# Patient Record
Sex: FEMALE | Race: BLACK OR AFRICAN AMERICAN | NOT HISPANIC OR LATINO | Employment: FULL TIME | URBAN - METROPOLITAN AREA
[De-identification: names, ages, dates, MRNs, and addresses within clinical notes are randomized per-mention and may not be internally consistent; named-entity substitution may affect disease eponyms.]

---

## 2019-02-18 ENCOUNTER — OFFICE VISIT (OUTPATIENT)
Dept: OTOLARYNGOLOGY | Facility: CLINIC | Age: 49
End: 2019-02-18
Payer: COMMERCIAL

## 2019-02-18 VITALS — DIASTOLIC BLOOD PRESSURE: 98 MMHG | HEART RATE: 80 BPM | WEIGHT: 130.06 LBS | SYSTOLIC BLOOD PRESSURE: 206 MMHG

## 2019-02-18 DIAGNOSIS — J38.5 LARYNGEAL SPASM: ICD-10-CM

## 2019-02-18 DIAGNOSIS — R06.6 SPASM OF DIAPHRAGM: ICD-10-CM

## 2019-02-18 DIAGNOSIS — R49.0 DYSPHONIA: ICD-10-CM

## 2019-02-18 DIAGNOSIS — G25.9 MOVEMENT DISORDER: ICD-10-CM

## 2019-02-18 DIAGNOSIS — R13.14 DYSPHAGIA, PHARYNGOESOPHAGEAL: Primary | ICD-10-CM

## 2019-02-18 PROCEDURE — 31579 PR LARYNGOSCOPY, FLEX/RIGID TELESCOPIC, W/STROBOSCOPY: ICD-10-PCS | Mod: S$GLB,,, | Performed by: OTOLARYNGOLOGY

## 2019-02-18 PROCEDURE — 99999 PR PBB SHADOW E&M-NEW PATIENT-LVL III: ICD-10-PCS | Mod: PBBFAC,,, | Performed by: OTOLARYNGOLOGY

## 2019-02-18 PROCEDURE — 99243 PR OFFICE CONSULTATION,LEVEL III: ICD-10-PCS | Mod: 25,S$GLB,, | Performed by: OTOLARYNGOLOGY

## 2019-02-18 PROCEDURE — 99243 OFF/OP CNSLTJ NEW/EST LOW 30: CPT | Mod: 25,S$GLB,, | Performed by: OTOLARYNGOLOGY

## 2019-02-18 PROCEDURE — 31579 LARYNGOSCOPY TELESCOPIC: CPT | Mod: S$GLB,,, | Performed by: OTOLARYNGOLOGY

## 2019-02-18 PROCEDURE — 99999 PR PBB SHADOW E&M-NEW PATIENT-LVL III: CPT | Mod: PBBFAC,,, | Performed by: OTOLARYNGOLOGY

## 2019-02-18 RX ORDER — LOSARTAN POTASSIUM 50 MG/1
50 TABLET ORAL DAILY
COMMUNITY
Start: 2019-01-31

## 2019-02-18 RX ORDER — GLIPIZIDE 10 MG/1
10 TABLET, FILM COATED, EXTENDED RELEASE ORAL 2 TIMES DAILY
COMMUNITY
Start: 2019-01-31

## 2019-02-18 RX ORDER — PRAVASTATIN SODIUM 80 MG/1
80 TABLET ORAL NIGHTLY
COMMUNITY
Start: 2019-01-31

## 2019-02-18 RX ORDER — METFORMIN HYDROCHLORIDE 500 MG/1
1000 TABLET, EXTENDED RELEASE ORAL 2 TIMES DAILY WITH MEALS
COMMUNITY
Start: 2019-01-31

## 2019-02-18 NOTE — LETTER
February 20, 2019      Kingsley Gupta Jr., MD  [address] [facility]  [address]  [facility]  [facility] [address]           [facility]  [address] [facility] [address]  [facility] [address]  Phone: [phone]  Fax: [phone]          Patient: Cynthia Peck   MR Number: [id number]   YOB: 1970   Date of Visit: 2/18/2019       Dear Dr. Kingsley Gupta Jr.:    Thank you for referring Cynthia Peck to me for evaluation. Attached you will find relevant portions of my assessment and plan of care.    If you have questions, please do not hesitate to call me. I look forward to following Cynthia Peck along with you.    Sincerely,    Benigno Esparza MD    Enclosure  CC:  Kayy Siu MD    If you would like to receive this communication electronically, please contact [email or web address] or [phone] to request more information on [employer] Link access.    For providers and/or their staff who would like to refer a patient to [facility], please contact us through our one-stop-shop provider referral line, [email or web address] at [phone].    If you feel you have received this communication in error or would no longer like to receive these types of communications, please e-mail [email or web address]

## 2019-02-18 NOTE — PROGRESS NOTES
"[facility]  Department of Otorhinolaryngology and Communication Sciences    Cynthia Peck is a 48 y.o. female who [facility] the [facility] for consultation at the kind request of Dr. Kingsley Gupta Jr. for further management of dysphonia.     She complains of intermittent voice loss, voice spasms, and non-volitional sounds coming from her throat.     Onset was gradual. Duration is 3 months, following a URI around . She was in the hospital from -Dec 5 for diaphragmatic spasms.  She was d/c on baclofen for 30 days; she reports the spasms still happened but it seemed to "calm them down a bit".  Time course is intermittent. Symptoms are stable. Exacerbating factors include voice use. She denies any alleviating factors. Associated symptoms include occasional SOB; intermittent solid/liquid dysphagia.  She reports she sometimes "gets choked" with solids, liquids, and with taking pills.      She has never seen a neurologist.  Dr. Gupta sent a referral to speech at [facility], but she does not think she has done that yet.  She recalls having a CT scan recently and thinks it was normal.  She met with a pulmonologist when she was in the hospital, who diagnosed the diaphragm spasms.  She was admitted after she was unable to complete PFTs.  Cardiac workup has been negative.      Voice Handicap Index-10 (VHI-10) score is 31.   Reflux Symptom Index (RSI) score is 13.   Eating Assessment Tool-10 (EAT-10) score is 10.   Dyspnea Index (DI) score is 18.    Past Medical History  DM  HTN  High cholesterol    Past Surgical History    Gallstones   Tubal ligation    Family History  Daughter: neurology for  shunt    Social History  She reports that  has never smoked. she has never used smokeless tobacco.    Allergies  She has No Known Allergies.    Medications  She has a current medication list which includes the following prescription(s): semaglutide, glipizide, losartan, metformin, and " pravastatin.    Review of Systems   Constitutional: Negative for fever.   HENT: Negative for sore throat.    Eyes: Negative for visual disturbance.   Respiratory: Negative for wheezing.    Cardiovascular: Negative for chest pain.   Gastrointestinal: Negative for nausea.   Musculoskeletal: Negative for arthralgias.   Skin: Negative for rash.   Neurological: Negative for tremors.   Hematological: Does not bruise/bleed easily.   Psychiatric/Behavioral: The patient is not nervous/anxious.           Objective:     BP (!) 206/98 (Patient Position: Sitting)   Pulse 80   Wt 59 kg (130 lb 1.1 oz)      Physical Exam    Constitutional: comfortable, well dressed  Psychiatric: appropriate affect  Respiratory: comfortably breathing, symmetric chest rise, no stridor; variable chest/abdominal spasms  Voice: variable mild roughness, variable mild breathy phonatory breaks   Cardiovascular: upper extremities non-edematous  Lymphatic: no cervical lymphadenopathy  Neurologic: alert and oriented to time, place, person, and situation; cranial nerves 3-12 grossly intact  Head: normocephalic  Eyes: conjunctivae and sclerae clear  Ears: normal pinnae, normal external auditory canals, tympanic membranes intact  Nose: mucosa pink and noncongested, no masses, no mucopurulence, no polyps  Oral cavity / oropharynx: no mucosal lesions  Neck: soft, full range of motion, laryngotracheal complex palpable with appropriate landmarks, larynx elevates on swallowing  Indirect laryngoscopy: limited due to gag    Procedure  Flexible Laryngeal Videostroboscopy (14340): Laryngeal videostroboscopy is indicated to assess laryngeal vibratory biomechanics and vocal fold oscillation, which cannot be assessed with a plain light examination. This was carried out transnasally with a distal chip videoendoscope. After verbal consent was obtained, the patient was positioned and the nose was topically decongested with 1% phenylephrine and topically anesthetized with 4%  lidocaine. The endoscope was passed through the most patent nasal cavity and positioned to image the nasopharynx, larynx, and hypopharynx in detail. The following features were examined: nasopharyngeal, laryngeal, hypopharyngeal masses; velopharyngeal strength, closure, and symmetry of motion; vocal fold range and symmetry of motion; laryngeal mucosal edema, erythema, inflammation, and hydration; salivary pooling; and gross laryngeal sensation. During phonation, the vocal folds were assessed for glottal closure; mucosal wave; vocal fold lesions; vibratory periodicity, amplitude, and phase symmetry; and vertical height match. The equipment was removed. The patient tolerated the procedure well without complication. All findings were normal except:  - variable myoclonic abductory glottic spasms, both on respiration and on phonation, without predeliction for AD/AB phonemes  - no paralysis/paresis  - complete closure, pliability intact        Assessment:     Cynthia Peck is a 48 y.o. female with multiple aerodigestive symptoms. I note some involuntary laryngeal movements, which appear to accompany lower respiratory/diaphragmatic spasms. 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     Plan:        I had a discussion with the patient regarding her condition and the further workup and management options.      I recommended comprehensive neurological evaluation. Depending on findings and/or response to any pharmacological agent, we may consider trial of laryngeal botox injection.    She will follow up with me after her neurology workup is complete.    All questions were answered, and the patient is in agreement with the above.     Benigno Esparza M.D.  [facility]  Department of Otorhinolaryngology and Communication Sciences

## 2019-02-20 ENCOUNTER — OFFICE VISIT (OUTPATIENT)
Dept: NEUROLOGY | Facility: CLINIC | Age: 49
End: 2019-02-20
Payer: COMMERCIAL

## 2019-02-20 VITALS
HEIGHT: 62 IN | HEART RATE: 85 BPM | SYSTOLIC BLOOD PRESSURE: 139 MMHG | WEIGHT: 130.06 LBS | DIASTOLIC BLOOD PRESSURE: 88 MMHG | BODY MASS INDEX: 23.93 KG/M2

## 2019-02-20 DIAGNOSIS — J98.6 DIAPHRAGMATIC DISORDER: ICD-10-CM

## 2019-02-20 DIAGNOSIS — J98.6 DIAPHRAGM DYSFUNCTION: Primary | ICD-10-CM

## 2019-02-20 PROCEDURE — 3008F PR BODY MASS INDEX (BMI) DOCUMENTED: ICD-10-PCS | Mod: CPTII,S$GLB,, | Performed by: PSYCHIATRY & NEUROLOGY

## 2019-02-20 PROCEDURE — 99999 PR PBB SHADOW E&M-EST. PATIENT-LVL III: ICD-10-PCS | Mod: PBBFAC,,, | Performed by: PSYCHIATRY & NEUROLOGY

## 2019-02-20 PROCEDURE — 99204 PR OFFICE/OUTPT VISIT, NEW, LEVL IV, 45-59 MIN: ICD-10-PCS | Mod: S$GLB,,, | Performed by: PSYCHIATRY & NEUROLOGY

## 2019-02-20 PROCEDURE — 99204 OFFICE O/P NEW MOD 45 MIN: CPT | Mod: S$GLB,,, | Performed by: PSYCHIATRY & NEUROLOGY

## 2019-02-20 PROCEDURE — 99999 PR PBB SHADOW E&M-EST. PATIENT-LVL III: CPT | Mod: PBBFAC,,, | Performed by: PSYCHIATRY & NEUROLOGY

## 2019-02-20 PROCEDURE — 3008F BODY MASS INDEX DOCD: CPT | Mod: CPTII,S$GLB,, | Performed by: PSYCHIATRY & NEUROLOGY

## 2019-02-20 RX ORDER — LEVETIRACETAM 500 MG/1
500 TABLET ORAL 2 TIMES DAILY
Qty: 60 TABLET | Refills: 11 | Status: SHIPPED | OUTPATIENT
Start: 2019-02-20 | End: 2019-03-07 | Stop reason: SDUPTHER

## 2019-02-20 NOTE — LETTER
February 20, 2019      Benigno Esparza MD  [address]  [facility] [address]           Davin Broderick  Neurology  [address]  [facility] [address]  Phone: [phone]  Fax: [phone]          Patient: Cynthia Peck   MR Number: [id number]   YOB: 1970   Date of Visit: 2/20/2019       Dear Dr. Benigno Esparza:    Thank you for referring Cynthia Peck to me for evaluation. Attached you will find relevant portions of my assessment and plan of care.    If you have questions, please do not hesitate to call me. I look forward to following Cynthia Peck along with you.    Sincerely,    Kayy Siu MD    Enclosure  CC:  No Recipients    If you would like to receive this communication electronically, please contact [email or web address] or [phone] to request more information on [employer] Link access.    For providers and/or their staff who would like to refer a patient to [facility], please contact us through our one-stop-shop provider referral line, [facility], at [phone].    If you feel you have received this communication in error or would no longer like to receive these types of communications, please e-mail [email or web address]

## 2019-02-20 NOTE — PROGRESS NOTES
"MOV[facility] DISORDERS CLINIC NEW CONSULT NOTE    PCP/Referring Provider: Benigno Esparza MD  [address] [place] [address]  Date of Service: 2/20/2019    Chief Complaint: Dysphonia and diaphragmatic spasm    HPI: Cynthia Peck is a R HANDED 48 y.o. female with a medical issues significant for DM, HTN, HL, who presents with dysphonia and diaphragmatic contractions. She had a typical URI November 2018, and then afterwards noticed near constant diaphragmatic contractions pushing the air out of her lungs and at the same time throat spasming which results in her uttering "clucking sounds." Disease is not progressing. She is unclear if it occurs during sleep. Spasming can quit for 2 days and then come back to near-constant. No triggers that she can think of. Talking does not make it worse. 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 When she tries to speak, words are interrupted by spasming and chucking. Voice is breathy and high-pitched.     No curling to fingers, toes, or spasming in neck muscles. No trouble walking or stumbing.   She was admitted to the hospital Nov 30th.    Tried baclofen "3 times a day" - which helped but due to sleepiness, stopped  ETOH does not seem to help    Thinks she had a brain MRI while admitted  No major weightloss noted  Daughter had CP, dystonic features in hands (she is with mom today) - wide based spastic gait- trouble walking as a child, and this "got better" - has a  shunt    Review of Systems:   Review of Systems   Constitutional: Negative for fever.   HENT: Negative for congestion.    Eyes: Negative for double vision.   Respiratory: Negative for cough and shortness of breath.    Cardiovascular: Negative for chest pain and leg swelling.   Gastrointestinal: Negative for nausea.   Genitourinary: Negative for dysuria.   Musculoskeletal: Negative for falls.   Skin: Negative for rash.   Neurological: Positive for speech change. Negative for tremors " "and headaches.   Psychiatric/Behavioral: Negative for depression.       Neuroleptic exposure:  None    Current Medications:  Outpatient Encounter Medications as of 2/20/2019   Medication Sig Dispense Refill    glipiZIDE (GLUCOTROL) 10 MG TR24       losartan (COZAAR) 50 MG tablet       metFORMIN (GLUCOPHAGE-XR) 500 MG 24 hr tablet       pravastatin (PRAVACHOL) 80 MG tablet       semaglutide (OZEMPIC) 0.25 mg or 0.5 mg(2 mg/1.5 mL) PnIj Inject 0.25 mg into the skin every 7 days.      levETIRAcetam (KEPPRA) 500 MG Tab Take 1 tablet (500 mg total) by mouth 2 (two) times daily. 60 tablet 11     No facility-administered encounter medications on file as of 2/20/2019.        Past Medical History:  HTN, HL, Diabetes    Past Surgical History:  None    Current Living Situation: home with daughter    Social:  Social History     Socioeconomic History    Marital status:      Spouse name: Not on file    Number of children: Not on file    Years of education: Not on file    Highest education level: Not on file   Social Needs    Financial resource strain: Not on file    Food insecurity - worry: Not on file    Food insecurity - inability: Not on file    Transportation needs - medical: Not on file    Transportation needs - non-medical: Not on file   Occupational History    Not on file   Tobacco Use    Smoking status: Never Smoker    Smokeless tobacco: Never Used   Substance and Sexual Activity    Alcohol use: Not on file    Drug use: Not on file    Sexual activity: Not on file   Other Topics Concern    Not on file   Social History Narrative    Not on file       Family History:  As above    PHYSICAL:  /88   Pulse 85   Ht 5' 2" (1.575 m)   Wt 59 kg (130 lb 1.1 oz)   BMI 23.79 kg/m²     General Medical Examination:  General: Good hygiene, appropriate appearance.  HEENT: Normocephalic, atraumatic.   Neck: Supple.   Chest: Unlabored breathing.   CV: Symmetric pulses.   Ext: No clubbing, cyanosis, or " "edema.     Mental Status:  Mood/Affect: Appropriate/congruent.  Level of consciousness: Awake, alert.  Orientation: Oriented to person, place, time and situation.  Language: No Dysarthria    Cranial nerves:  I: Not tested  II: PERRL, VFF to counting  III, IV, VI: EOMI with conjugate gaze and no nystagmus on end gaze  V: Facial sensation intact and symmetric over the bilateral V1-V3  VII: Facial muscle activation intact and symmetric over the bilateral upper and lower face  VIII: Hearing intact in the b/l ears and symmetrical to finger rub  IX, X, XII: TUP midline - no atrophy or fasiculations  X: SCMs and shoulder shrug full strength b/l and symmetric  Nohypomimia    Motor:   Strength Intact throughout upper and lower extremities, 5/5.  Fasciculations/Atrophy: None    At times tilts head R  No tremors or dystonic posturing in hands  No stimulus-induced myoclonus    Near constant irregular pattern of strong diaphragmatic contractions that audibly forces air from her lungs  Makes chuckling sounds from time to time ("Heh-Heh") which are out of her control - unsuppressable  Speech is breathy- strained - she can speak in short broken sentences interrupted by her airflow and chucking sounds    DTRs:  ? Biceps Triceps Brachioradialis Knee Ankle   Left 2+ 2+ 2+ 2+ 2+   Right 2+ 2+ 2+ 2+ 2+   -Plantar reflex: down    ? Finger taps Finger flicks ADELITA Heel taps   Left 0 0 0 0   Right 0 0 0 0   Neck tone: 0  ? Arm Leg   Left 0 0   Right 0 0     Sensation:   -Light touch: Intact and symmetric in the bilateral upper and lower extremities.  -Temp: Intact and symmetric in the bilateral upper and lower extremities.  -Vibration: Intact and symmetric in the bilateral upper and lower extremities.    Coordination:   -Finger to nose: nl  -Heel to shin: nl    Gait:  -Arises from chair withnl use of hands.  -Casual gait is: nl based  -Stride length: nl  -Arm Swing: nl  -Turning: nl  -Heel walking: nl  -Toe walking: nl  -Tandem gait: " "nl    Laboratory Data:  NA    Imaging:  NA    Assessment//Plan: Cynthia Peck is a R HANDED 48 y.o. female with a medical issues significant for DM, HTN, HL, who presents with dysphonia and diaphragmatic contractions.  Problem List Items Addressed This Visit        Pulmonary    Diaphragmatic disorder    Current Assessment & Plan     Sudden onset vocal and diaphragmatic spasming, post-viral suggests post-encephalitic myoclonus. Unclear if her vocal spasming is dystonic. She will follow with ENT for possible botox. I suggested she bring her MRI brain for review. May also suggest a Cspine and thoracic view if MRI brain NL. F/u paraneoplastic, ammonia, cmp, TSH. She does have a daughter with "CP" with dystonic features and a  shunt. Unclear if related. Suggested Keppra 500mg Po BID for myoclonus.           Other Visit Diagnoses     Diaphragm dysfunction    -  Primary    Relevant Orders    TSH    Paraneoplastic Autoantibody Evaulation, Serum    Ammonia    Comprehensive metabolic panel          Follow-up: 1 month    Kayy Siu MD, MS [facility]  Department of Neurology  Movement Disorders    "

## 2019-02-20 NOTE — ASSESSMENT & PLAN NOTE
"Sudden onset vocal and diaphragmatic spasming, post-viral suggests post-encephalitic myoclonus. Unclear if her vocal spasming is dystonic. She will follow with ENT for possible botox. I suggested she bring her MRI brain for review. May also suggest a Cspine and thoracic view if MRI brain NL. F/u paraneoplastic, ammonia, cmp, TSH. She does have a daughter with "CP" with dystonic features and a  shunt. Unclear if related. Suggested Keppra 500mg Po BID for myoclonus.  "

## 2019-02-21 ENCOUNTER — TELEPHONE (OUTPATIENT)
Dept: NEUROLOGY | Facility: CLINIC | Age: 49
End: 2019-02-21

## 2019-02-21 DIAGNOSIS — J98.6 DIAPHRAGMATIC DISORDER: Primary | ICD-10-CM

## 2019-02-21 NOTE — TELEPHONE ENCOUNTER
Returned call to patient again.  Informed that MRIs weren't ordered, but will communicate with Dr Siu that patient is ready to do imaging.  Asking if provider will order. Please advise

## 2019-02-21 NOTE — TELEPHONE ENCOUNTER
----- Message from Emerson Ruiz sent at 2/21/2019  1:28 PM CST -----  Contact: Pt. [phone]  .Needs Advice    Reason for call: The patient would like to speak to someone regarding scheduling her imaging. Please contact the patient to discuss further.          Communication Preference:PHONE     Additional Information:

## 2019-02-21 NOTE — TELEPHONE ENCOUNTER
----- Message from Deonna Mendoza sent at 2/21/2019  4:54 PM CST -----  Contact: self @ [phone]  Pt says she is returning your call.

## 2019-02-28 ENCOUNTER — HOSPITAL ENCOUNTER (OUTPATIENT)
Dept: RADIOLOGY | Facility: HOSPITAL | Age: 49
Discharge: HOME | End: 2019-02-28
Attending: PSYCHIATRY & NEUROLOGY
Payer: COMMERCIAL

## 2019-02-28 DIAGNOSIS — J98.6 DIAPHRAGMATIC DISORDER: ICD-10-CM

## 2019-02-28 PROCEDURE — A9585 GADOBUTROL INJECTION: HCPCS | Performed by: PSYCHIATRY & NEUROLOGY

## 2019-02-28 PROCEDURE — 70553 MRI BRAIN STEM W/O & W/DYE: CPT | Mod: 26,,, | Performed by: RADIOLOGY

## 2019-02-28 PROCEDURE — 70553 MRI BRAIN DEMYELINATING W/ WO CONTRAST: ICD-10-PCS | Mod: 26,,, | Performed by: RADIOLOGY

## 2019-02-28 PROCEDURE — 70553 MRI BRAIN STEM W/O & W/DYE: CPT | Mod: TC

## 2019-02-28 PROCEDURE — 25500020 PHARM REV CODE 255: Performed by: PSYCHIATRY & NEUROLOGY

## 2019-02-28 RX ORDER — GADOBUTROL 604.72 MG/ML
6 INJECTION INTRAVENOUS
Status: COMPLETED | OUTPATIENT
Start: 2019-02-28 | End: 2019-02-28

## 2019-02-28 RX ADMIN — GADOBUTROL 6 ML: 604.72 INJECTION INTRAVENOUS at 01:02

## 2019-03-07 DIAGNOSIS — J98.6 DIAPHRAGMATIC DISORDER: Primary | ICD-10-CM

## 2019-03-07 RX ORDER — LEVETIRACETAM 500 MG/1
1000 TABLET ORAL 2 TIMES DAILY
Qty: 120 TABLET | Refills: 11 | Status: SHIPPED | OUTPATIENT
Start: 2019-03-07 | End: 2019-03-29

## 2019-03-08 ENCOUNTER — TELEPHONE (OUTPATIENT)
Dept: NEUROLOGY | Facility: CLINIC | Age: 49
End: 2019-03-08

## 2019-03-08 NOTE — TELEPHONE ENCOUNTER
----- Message from Jazlyn Stephenson sent at 3/8/2019  2:05 PM CST -----  Contact: pt   Patient Returning Call from [facility]    Who Left Message for Patient: pt is returning the nurses phone call   Communication Preference: can you please call pt at [phone]  Additional Information: none    TANI

## 2019-03-15 ENCOUNTER — HOSPITAL ENCOUNTER (OUTPATIENT)
Dept: RADIOLOGY | Facility: HOSPITAL | Age: 49
Discharge: HOME | End: 2019-03-15
Attending: PSYCHIATRY & NEUROLOGY
Payer: COMMERCIAL

## 2019-03-15 DIAGNOSIS — J98.6 DIAPHRAGMATIC DISORDER: ICD-10-CM

## 2019-03-15 PROCEDURE — 72157 MRI CHEST SPINE W/O & W/DYE: CPT | Mod: 26,,, | Performed by: RADIOLOGY

## 2019-03-15 PROCEDURE — 72156 MRI NECK SPINE W/O & W/DYE: CPT | Mod: 26,,, | Performed by: RADIOLOGY

## 2019-03-15 PROCEDURE — 72157 MRI THORACIC SPINE DEMYELINATING W W/O CONTRAST: ICD-10-PCS | Mod: 26,,, | Performed by: RADIOLOGY

## 2019-03-15 PROCEDURE — 72156 MRI CERVICAL SPINE DEMYELINATING W W/O CONTRAST: ICD-10-PCS | Mod: 26,,, | Performed by: RADIOLOGY

## 2019-03-15 PROCEDURE — A9585 GADOBUTROL INJECTION: HCPCS | Performed by: PSYCHIATRY & NEUROLOGY

## 2019-03-15 PROCEDURE — 72156 MRI NECK SPINE W/O & W/DYE: CPT | Mod: TC

## 2019-03-15 PROCEDURE — 72157 MRI CHEST SPINE W/O & W/DYE: CPT | Mod: TC

## 2019-03-15 PROCEDURE — 25500020 PHARM REV CODE 255: Performed by: PSYCHIATRY & NEUROLOGY

## 2019-03-15 RX ORDER — GADOBUTROL 604.72 MG/ML
6 INJECTION INTRAVENOUS
Status: COMPLETED | OUTPATIENT
Start: 2019-03-15 | End: 2019-03-15

## 2019-03-15 RX ADMIN — GADOBUTROL 6 ML: 604.72 INJECTION INTRAVENOUS at 12:03

## 2019-03-19 ENCOUNTER — TELEPHONE (OUTPATIENT)
Dept: NEUROLOGY | Facility: CLINIC | Age: 49
End: 2019-03-19

## 2019-03-19 NOTE — TELEPHONE ENCOUNTER
----- Message from Deonna Mendoza sent at 3/19/2019 10:43 AM CDT -----  Contact: self @ [phone]  Pt is calling for her MRI results from 3-15-19.  Pls call to discuss.

## 2019-03-20 NOTE — TELEPHONE ENCOUNTER
----- Message from Deonna Mendoza sent at 3/20/2019  3:34 PM CDT -----  Contact: self @ [phone]  Pt says she is returning your call from yesterday.

## 2019-03-20 NOTE — TELEPHONE ENCOUNTER
Returned call to patient; states needing ALL MRI reports faxed to Employer so disability can continue being paid.  Informed will fax reports after patient called with results.   Fax attention to: Mylene KENDALL# [phone]    Patient waiting on results please advise

## 2019-03-20 NOTE — TELEPHONE ENCOUNTER
----- Message from Bobby Chacko sent at 3/20/2019  8:39 AM CDT -----  Contact: Pt:[phone]  .Needs Advice    Reason for call:Pt states she would like to speak with the nurse in regards to getting her report for her MRI faxed to her employer for insurance.         Communication Preference:Pt:[phone]    Additional Information:

## 2019-03-27 ENCOUNTER — OFFICE VISIT (OUTPATIENT)
Dept: OTOLARYNGOLOGY | Facility: CLINIC | Age: 49
End: 2019-03-27
Payer: COMMERCIAL

## 2019-03-27 ENCOUNTER — HOSPITAL ENCOUNTER (EMERGENCY)
Facility: HOSPITAL | Age: 49
Discharge: HOME | End: 2019-03-27
Attending: EMERGENCY MEDICINE
Payer: COMMERCIAL

## 2019-03-27 ENCOUNTER — DOCUMENTATION ONLY (OUTPATIENT)
Dept: NEUROLOGY | Facility: CLINIC | Age: 49
End: 2019-03-27

## 2019-03-27 ENCOUNTER — TELEPHONE (OUTPATIENT)
Dept: NEUROLOGY | Facility: CLINIC | Age: 49
End: 2019-03-27

## 2019-03-27 VITALS
OXYGEN SATURATION: 99 % | SYSTOLIC BLOOD PRESSURE: 197 MMHG | BODY MASS INDEX: 26 KG/M2 | WEIGHT: 129 LBS | RESPIRATION RATE: 16 BRPM | HEIGHT: 59 IN | HEART RATE: 82 BPM | TEMPERATURE: 98 F | DIASTOLIC BLOOD PRESSURE: 93 MMHG

## 2019-03-27 DIAGNOSIS — I10 HYPERTENSION, UNSPECIFIED TYPE: Primary | ICD-10-CM

## 2019-03-27 DIAGNOSIS — J98.6 DIAPHRAGMATIC DISORDER: Primary | ICD-10-CM

## 2019-03-27 PROCEDURE — 99213 OFFICE O/P EST LOW 20 MIN: CPT | Mod: S$GLB,,, | Performed by: OTOLARYNGOLOGY

## 2019-03-27 PROCEDURE — 99999 PR PBB SHADOW E&M-EST. PATIENT-LVL III: ICD-10-PCS | Mod: PBBFAC,,, | Performed by: OTOLARYNGOLOGY

## 2019-03-27 PROCEDURE — 99282 PR EMERGENCY DEPT VISIT,LEVEL II: ICD-10-PCS | Mod: ,,, | Performed by: NURSE PRACTITIONER

## 2019-03-27 PROCEDURE — 99999 PR PBB SHADOW E&M-EST. PATIENT-LVL III: CPT | Mod: PBBFAC,,, | Performed by: OTOLARYNGOLOGY

## 2019-03-27 PROCEDURE — 99283 EMERGENCY DEPT VISIT LOW MDM: CPT

## 2019-03-27 PROCEDURE — 99282 EMERGENCY DEPT VISIT SF MDM: CPT | Mod: ,,, | Performed by: NURSE PRACTITIONER

## 2019-03-27 PROCEDURE — 25000003 PHARM REV CODE 250: Performed by: NURSE PRACTITIONER

## 2019-03-27 PROCEDURE — 99213 PR OFFICE/OUTPT VISIT, EST, LEVL III, 20-29 MIN: ICD-10-PCS | Mod: S$GLB,,, | Performed by: OTOLARYNGOLOGY

## 2019-03-27 RX ORDER — LOSARTAN POTASSIUM 50 MG/1
50 TABLET ORAL
Status: COMPLETED | OUTPATIENT
Start: 2019-03-27 | End: 2019-03-27

## 2019-03-27 RX ORDER — ACETAMINOPHEN 500 MG
1000 TABLET ORAL
Status: COMPLETED | OUTPATIENT
Start: 2019-03-27 | End: 2019-03-27

## 2019-03-27 RX ADMIN — ACETAMINOPHEN 1000 MG: 500 TABLET ORAL at 02:03

## 2019-03-27 RX ADMIN — LOSARTAN POTASSIUM 50 MG: 50 TABLET, FILM COATED ORAL at 02:03

## 2019-03-27 NOTE — TELEPHONE ENCOUNTER
----- Message from Emerson Ruiz sent at 3/27/2019 12:16 PM CDT -----  Contact: Dr. Esparza [phone]  The patient pressure was high and she's heading down to the ER

## 2019-03-27 NOTE — ED NOTES
Discharge home with family, states understanding to follow up as directed. Ambulates out of ED without difficulty. Med prior to discharge,

## 2019-03-27 NOTE — Clinical Note
This is an unusual case. I'm not sure what to do for this patient, but I do not think I have anything more to offer her.

## 2019-03-27 NOTE — PROGRESS NOTES
Went over MRI brain and spine with neuro-radiology, who do not find any significant lesions which could account for her abdominal myoclonus

## 2019-03-27 NOTE — PATIENT INSTRUCTIONS
Go to the emergency room to have your blood pressure addressed  Follow up with neurology  Call with questions

## 2019-03-27 NOTE — ED TRIAGE NOTES
Patient states she was in doctor office, blood pressure elevated 200s, sent to ED. Currently concerned with headache. Did not take am meds.

## 2019-03-27 NOTE — ED NOTES
Patient identifiers verified and correct for Ms Peck  C/C: Elevated blood pressure SEE NN  APPEARANCE: awake and alert in NAD.  SKIN: warm, dry and intact. No breakdown or bruising.  MUSCULOSKELETAL: Patient moving all extremities spontaneously, no obvious swelling or deformities noted. Ambulates independently.  RESPIRATORY: Denies shortness of breath.Respirations unlabored.   CARDIAC: Denies CP, 2+ distal pulses; no peripheral edema  ABDOMEN: S/ND/NT, Denies nausea  : voids spontaneously, denies difficulty  Neurologic: AAO x 4; follows commands equal strength in all extremities; denies numbness/tingling. Denies dizziness Positive gen weakness and headche

## 2019-03-27 NOTE — PROGRESS NOTES
[facility]  Department of Otorhinolaryngology and Communication Sciences    Subjective:      Cynthia Peck is a 49 y.o. female who presents for follow-up. She has multiple aerodigestive symptoms. I note some involuntary laryngeal movements, which appear to accompany lower respiratory/diaphragmatic spasms.    Since her last visit she says her symptoms have been essentially stable. The patient's  today provides additional history. They say her symptoms come and go. When the breathing spasms are not present, her voice is normal. The spasms tend to come and go. Sometimes she will go a day, or even as long as 3 days at one time, without the spasms. There are uncertain if the PO meds Rx'd by Dr. Siu have been helpful.    The patient's medications, allergies, past medical, surgical, social and family histories were reviewed and updated as appropriate.    A detailed review of systems was obtained with pertinent positives as per the above HPI, and otherwise negative.      Objective:     Pt's BP was elevated to 220/90    Intermittent observable respiratory spasms involving abdomen/thorax  Voice with variable phonatory breaks during respiratory spasms but with several periods of clear resonant phonation    Patient reported feeling lightheaded and was transported via wheelchair to the ED for BP stabilization    Assessment:     Cynthia Peck is a 49 y.o. female with multiple aerodigestive symptoms. She has some involuntary laryngeal movements, which appear to accompany lower respiratory/diaphragmatic spasms.      Plan:     The patient was transported to the ED for BP stabilization.     As discussed with her at her last visit, it is my impression that she does not have an intrinsic laryngeal dystonia but rather that the observed laryngeal movements are secondary to her diaphragmatic spasms. As such, I do not favor laryngeal Botox injection. I recommended continued neurology follow up for further guidance on  her condition.      She will follow up with me in the future on an as-needed basis.    All questions were answered, and the patient is in agreement with the plan.    Benigno Esparza M.D.  [facility]  Department of Otorhinolaryngology and Communication Sciences

## 2019-03-29 ENCOUNTER — OFFICE VISIT (OUTPATIENT)
Dept: NEUROLOGY | Facility: CLINIC | Age: 49
End: 2019-03-29
Payer: COMMERCIAL

## 2019-03-29 VITALS
WEIGHT: 129 LBS | HEART RATE: 76 BPM | DIASTOLIC BLOOD PRESSURE: 81 MMHG | SYSTOLIC BLOOD PRESSURE: 159 MMHG | HEIGHT: 59 IN | BODY MASS INDEX: 26 KG/M2

## 2019-03-29 DIAGNOSIS — N28.1 RENAL CYST: Primary | ICD-10-CM

## 2019-03-29 DIAGNOSIS — J98.6 DIAPHRAGMATIC DISORDER: ICD-10-CM

## 2019-03-29 PROCEDURE — 3079F PR MOST RECENT DIASTOLIC BLOOD PRESSURE 80-89 MM HG: ICD-10-PCS | Mod: CPTII,S$GLB,, | Performed by: PSYCHIATRY & NEUROLOGY

## 2019-03-29 PROCEDURE — 99999 PR PBB SHADOW E&M-EST. PATIENT-LVL III: ICD-10-PCS | Mod: PBBFAC,,, | Performed by: PSYCHIATRY & NEUROLOGY

## 2019-03-29 PROCEDURE — 3077F SYST BP >= 140 MM HG: CPT | Mod: CPTII,S$GLB,, | Performed by: PSYCHIATRY & NEUROLOGY

## 2019-03-29 PROCEDURE — 3079F DIAST BP 80-89 MM HG: CPT | Mod: CPTII,S$GLB,, | Performed by: PSYCHIATRY & NEUROLOGY

## 2019-03-29 PROCEDURE — 99214 PR OFFICE/OUTPT VISIT, EST, LEVL IV, 30-39 MIN: ICD-10-PCS | Mod: S$GLB,,, | Performed by: PSYCHIATRY & NEUROLOGY

## 2019-03-29 PROCEDURE — 3077F PR MOST RECENT SYSTOLIC BLOOD PRESSURE >= 140 MM HG: ICD-10-PCS | Mod: CPTII,S$GLB,, | Performed by: PSYCHIATRY & NEUROLOGY

## 2019-03-29 PROCEDURE — 99214 OFFICE O/P EST MOD 30 MIN: CPT | Mod: S$GLB,,, | Performed by: PSYCHIATRY & NEUROLOGY

## 2019-03-29 PROCEDURE — 3008F PR BODY MASS INDEX (BMI) DOCUMENTED: ICD-10-PCS | Mod: CPTII,S$GLB,, | Performed by: PSYCHIATRY & NEUROLOGY

## 2019-03-29 PROCEDURE — 3008F BODY MASS INDEX DOCD: CPT | Mod: CPTII,S$GLB,, | Performed by: PSYCHIATRY & NEUROLOGY

## 2019-03-29 PROCEDURE — 99999 PR PBB SHADOW E&M-EST. PATIENT-LVL III: CPT | Mod: PBBFAC,,, | Performed by: PSYCHIATRY & NEUROLOGY

## 2019-03-29 RX ORDER — LEVETIRACETAM 1000 MG/1
TABLET ORAL
Qty: 90 TABLET | Refills: 11 | Status: SHIPPED | OUTPATIENT
Start: 2019-03-29

## 2019-03-29 NOTE — ASSESSMENT & PLAN NOTE
Sudden onset vocal and diaphragmatic spasming, post-viral suggests post-encephalitic myoclonus. Her brain, cspine and tspine was negative for obvious lesions responsible. DDx at this point includes post-viral central myoclonus vs peripheral nerve myoclonus. No weight loss to suggest paraneoplastic dz. Panel neg. Literature suggests keppra as first line, followed by clonazepam and phenytoin. Will suggest Keppra 1000/1500 daily to decrease myoclonus. She is quite debilitated socially and occupationally from her near constant spasming affecting speech.

## 2019-03-29 NOTE — PROGRESS NOTES
"MOV[facility]ORDERS [facility]INIC Followup  PCP/Referring Provider: Kayy Siu MD  [address] [place] [address]  Date of Service: 3/29/2019    Chief Complaint: Dysphonia and diaphragmatic spasm    Interval Hx  Since last visit, went to the ER for HTN  Doubled keppra to 1000mg PO BID 2 weeks ago. No change to abdominal spasming. Continues to have near constant abdominal spasming.  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 Her near constant abdominal spasming prevents her from talking in complete sentences.    Brain MRI, Cspine and Tspine NEG for lesions after reviewing them with radiology  MRI did show a renal cyst    No major weight loss noted    "PriorHPI: Cynthia Peck is a R HANDED 49 y.o. female with a medical issues significant for DM, HTN, HL, who presents with dysphonia and diaphragmatic contractions. She had a typical URI November 2018, and then afterwards noticed near constant diaphragmatic contractions pushing the air out of her lungs and at the same time throat spasming which results in her uttering "clucking sounds." Disease is not progressing. She is unclear if it occurs during sleep. Spasming can quit for 2 days and then come back to near-constant. No triggers that she can think of. Talking does not make it worse. 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 When she tries to speak, words are interrupted by spasming and chucking. Voice is breathy and high-pitched.     No curling to fingers, toes, or spasming in neck muscles. No trouble walking or stumbing.   She was admitted to the hospital Nov 30th.    Tried baclofen "3 times a day" - which helped but due to sleepiness, stopped  ETOH does not seem to help    Thinks she had a brain MRI while admitted  No major weightloss noted  Daughter had CP, dystonic features in hands (she is with mom today) - wide based spastic gait- trouble walking as a child, and this "got better" - has a  " "shunt"    Review of Systems:   Review of Systems   Constitutional: Negative for fever.   HENT: Negative for congestion.    Eyes: Negative for double vision.   Respiratory: Negative for cough and shortness of breath.    Cardiovascular: Negative for chest pain and leg swelling.   Gastrointestinal: Negative for nausea.   Genitourinary: Negative for dysuria.   Musculoskeletal: Negative for falls.   Skin: Negative for rash.   Neurological: Positive for speech change. Negative for tremors and headaches.   Psychiatric/Behavioral: Negative for depression.       Neuroleptic exposure:  None    Current Medications:  Outpatient Encounter Medications as of 3/29/2019   Medication Sig Dispense Refill    glipiZIDE (GLUCOTROL) 10 MG TR24 Take 10 mg by mouth 2 (two) times daily. Take 1 pill in am, 1/2 at night      losartan (COZAAR) 50 MG tablet Take 50 mg by mouth once daily.       metFORMIN (GLUCOPHAGE-XR) 500 MG 24 hr tablet Take 1,000 mg by mouth 2 (two) times daily with meals.       pravastatin (PRAVACHOL) 80 MG tablet Take 80 mg by mouth every evening.       semaglutide (OZEMPIC) 0.25 mg or 0.5 mg(2 mg/1.5 mL) PnIj Inject 0.25 mg into the skin every 7 days.      [DISCONTINUED] levETIRAcetam (KEPPRA) 500 MG Tab Take 2 tablets (1,000 mg total) by mouth 2 (two) times daily. 120 tablet 11    levETIRAcetam (KEPPRA) 1000 MG tablet 1000mg AM, 1500mg PM 90 tablet 11     No facility-administered encounter medications on file as of 3/29/2019.        Past Medical History:  HTN, HL, Diabetes    Past Surgical History:  None    Current Living Situation: home with daughter    Social:  Social History     Socioeconomic History    Marital status:      Spouse name: Not on file    Number of children: Not on file    Years of education: Not on file    Highest education level: Not on file   Occupational History    Not on file   Social Needs    Financial resource strain: Not on file    Food insecurity:     Worry: Not on file     " "Inability: Not on file    Transportation needs:     Medical: Not on file     Non-medical: Not on file   Tobacco Use    Smoking status: Never Smoker    Smokeless tobacco: Never Used   Substance and Sexual Activity    Alcohol use: Not Currently    Drug use: Never    Sexual activity: Not on file   Lifestyle    Physical activity:     Days per week: Not on file     Minutes per session: Not on file    Stress: Not on file   Relationships    Social connections:     Talks on phone: Not on file     Gets together: Not on file     Attends Scientologist service: Not on file     Active member of club or organization: Not on file     Attends meetings of clubs or organizations: Not on file     Relationship status: Not on file    Intimate partner violence:     Fear of current or ex partner: Not on file     Emotionally abused: Not on file     Physically abused: Not on file     Forced sexual activity: Not on file   Other Topics Concern    Not on file   Social History Narrative    Not on file       Family History:  As above    PHYSICAL:  BP (!) 159/81   Pulse 76   Ht 4' 11" (1.499 m)   Wt 58.5 kg (128 lb 15.5 oz)   BMI 26.05 kg/m²     General Medical Examination:  General: Good hygiene, appropriate appearance.  HEENT: Normocephalic, atraumatic.   Neck: Supple.   Chest: Unlabored breathing.   CV: Symmetric pulses.   Ext: No clubbing, cyanosis, or edema.     Mental Status:  Mood/Affect: Appropriate/congruent.  Level of consciousness: Awake, alert.  Orientation: Oriented to person, place, time and situation.  Language: No Dysarthria    Cranial nerves:  I: Not tested  II: PERRL, VFF to counting  III, IV, VI: EOMI with conjugate gaze and no nystagmus on end gaze  V: Facial sensation intact and symmetric over the bilateral V1-V3  VII: Facial muscle activation intact and symmetric over the bilateral upper and lower face  VIII: Hearing intact in the b/l ears and symmetrical to finger rub  IX, X, XII: TUP midline - no atrophy or " "fasiculations  X: SCMs and shoulder shrug full strength b/l and symmetric  Nohypomimia    Motor:   Strength Intact throughout upper and lower extremities, 5/5.  Fasciculations/Atrophy: None    At times tilts head R  No tremors or dystonic posturing in hands  No stimulus-induced myoclonus    Near constant irregular pattern of strong diaphragmatic contractions that audibly forces air from her lungs  Makes chuckling sounds from time to time ("Heh-Heh") which are out of her control - unsuppressable, nondistractable  Speech is breathy- strained - she can speak in short broken sentences interrupted by her airflow and chucking sounds    DTRs:  ? Biceps Triceps Brachioradialis Knee Ankle   Left 2+ 2+ 2+ 2+ 2+   Right 2+ 2+ 2+ 2+ 2+   -Plantar reflex: down    ? Finger taps Finger flicks ADELITA Heel taps   Left 0 0 0 0   Right 0 0 0 0   Neck tone: 0  ? Arm Leg   Left 0 0   Right 0 0     Sensation:   -Light touch: Intact and symmetric in the bilateral upper and lower extremities.  -Temp: Intact and symmetric in the bilateral upper and lower extremities.  -Vibration: Intact and symmetric in the bilateral upper and lower extremities.    Coordination:   -Finger to nose: nl  -Heel to shin: nl    Gait:  -Arises from chair withnl use of hands.  -Casual gait is: nl based  -Stride length: nl  -Arm Swing: nl  -Turning: nl  -Heel walking: nl  -Toe walking: nl  -Tandem gait: nl    Laboratory Data:  NA    Imaging:  NA    Assessment//Plan: Cynthia Peck is a R HANDED 48 y.o. female with a medical issues significant for DM, HTN, HL, who presents with dysphonia and diaphragmatic contractions.  Problem List Items Addressed This Visit        Pulmonary    Diaphragmatic disorder    Current Assessment & Plan     Sudden onset vocal and diaphragmatic spasming, post-viral suggests post-encephalitic myoclonus. Her brain, cspine and tspine was negative for obvious lesions responsible. DDx at this point includes post-viral central myoclonus vs peripheral " nerve myoclonus. No weight loss to suggest paraneoplastic dz. Panel neg. Literature suggests keppra as first line, followed by clonazepam and phenytoin. Will suggest Keppra 1000/1500 daily to decrease myoclonus. She is quite debilitated socially and occupationally from her near constant spasming affecting speech.            Renal/    Renal cyst - Primary    Current Assessment & Plan     Renal cyst visualized on Lspine MRI (uincidental). Per radiology will order renal US to r/o malignancy.         Relevant Orders    CV Ultrasound renal limited ([facility] Only)    US Kidney          Follow-up: 3 month    Kayy Siu MD, MS [facility]  Department of Neurology  Movement Disorders

## 2019-03-29 NOTE — ASSESSMENT & PLAN NOTE
Renal cyst visualized on Lspine MRI (uincidental). Per radiology will order renal US to r/o malignancy.

## 2019-04-05 ENCOUNTER — TELEPHONE (OUTPATIENT)
Dept: NEUROLOGY | Facility: CLINIC | Age: 49
End: 2019-04-05

## 2019-04-05 NOTE — TELEPHONE ENCOUNTER
Patient 1000 mg keppra on backorder at [facility] in [place]. Pharmacy asking for different Rx. Please advise

## 2019-04-10 ENCOUNTER — HOSPITAL ENCOUNTER (OUTPATIENT)
Dept: RADIOLOGY | Facility: HOSPITAL | Age: 49
Discharge: HOME | End: 2019-04-10
Attending: PSYCHIATRY & NEUROLOGY
Payer: COMMERCIAL

## 2019-04-10 DIAGNOSIS — N28.1 RENAL CYST: ICD-10-CM

## 2019-04-10 PROCEDURE — 76770 US EXAM ABDO BACK WALL COMP: CPT | Mod: TC

## 2019-04-10 PROCEDURE — 76770 US RETROPERITONEAL COMPLETE: ICD-10-PCS | Mod: 26,,, | Performed by: RADIOLOGY

## 2019-04-10 PROCEDURE — 76770 US EXAM ABDO BACK WALL COMP: CPT | Mod: 26,,, | Performed by: RADIOLOGY

## 2019-04-22 ENCOUNTER — TELEPHONE (OUTPATIENT)
Dept: NEUROLOGY | Facility: CLINIC | Age: 49
End: 2019-04-22

## 2019-04-22 NOTE — TELEPHONE ENCOUNTER
----- Message from Maty Allison sent at 4/22/2019  9:46 AM CDT -----  Test Results    Type of Test:US Kidney  Date of Test:4/10  Communication Preference:pt @ [phone]  Additional Information:

## 2019-04-24 ENCOUNTER — TELEPHONE (OUTPATIENT)
Dept: NEUROLOGY | Facility: CLINIC | Age: 49
End: 2019-04-24

## 2019-04-24 NOTE — TELEPHONE ENCOUNTER
----- Message from Nasir Jsaso sent at 4/24/2019 10:07 AM CDT -----  Patient Returning Call from [facility]    Who Left Message for Patient: Dr. Siu  Communication Preference: [phone]  Additional Information:

## 2019-05-29 ENCOUNTER — OFFICE VISIT (OUTPATIENT)
Dept: NEUROLOGY | Facility: CLINIC | Age: 49
End: 2019-05-29
Payer: COMMERCIAL

## 2019-05-29 VITALS
DIASTOLIC BLOOD PRESSURE: 94 MMHG | SYSTOLIC BLOOD PRESSURE: 147 MMHG | HEIGHT: 59 IN | WEIGHT: 128 LBS | BODY MASS INDEX: 25.8 KG/M2 | HEART RATE: 80 BPM

## 2019-05-29 DIAGNOSIS — N28.1 RENAL CYST: ICD-10-CM

## 2019-05-29 DIAGNOSIS — J98.6 DIAPHRAGMATIC DISORDER: Primary | ICD-10-CM

## 2019-05-29 PROCEDURE — 99214 PR OFFICE/OUTPT VISIT, EST, LEVL IV, 30-39 MIN: ICD-10-PCS | Mod: S$GLB,,, | Performed by: PSYCHIATRY & NEUROLOGY

## 2019-05-29 PROCEDURE — 3008F PR BODY MASS INDEX (BMI) DOCUMENTED: ICD-10-PCS | Mod: CPTII,S$GLB,, | Performed by: PSYCHIATRY & NEUROLOGY

## 2019-05-29 PROCEDURE — 3080F PR MOST RECENT DIASTOLIC BLOOD PRESSURE >= 90 MM HG: ICD-10-PCS | Mod: CPTII,S$GLB,, | Performed by: PSYCHIATRY & NEUROLOGY

## 2019-05-29 PROCEDURE — 99999 PR PBB SHADOW E&M-EST. PATIENT-LVL III: ICD-10-PCS | Mod: PBBFAC,,, | Performed by: PSYCHIATRY & NEUROLOGY

## 2019-05-29 PROCEDURE — 3080F DIAST BP >= 90 MM HG: CPT | Mod: CPTII,S$GLB,, | Performed by: PSYCHIATRY & NEUROLOGY

## 2019-05-29 PROCEDURE — 3008F BODY MASS INDEX DOCD: CPT | Mod: CPTII,S$GLB,, | Performed by: PSYCHIATRY & NEUROLOGY

## 2019-05-29 PROCEDURE — 99214 OFFICE O/P EST MOD 30 MIN: CPT | Mod: S$GLB,,, | Performed by: PSYCHIATRY & NEUROLOGY

## 2019-05-29 PROCEDURE — 99999 PR PBB SHADOW E&M-EST. PATIENT-LVL III: CPT | Mod: PBBFAC,,, | Performed by: PSYCHIATRY & NEUROLOGY

## 2019-05-29 PROCEDURE — 3077F SYST BP >= 140 MM HG: CPT | Mod: CPTII,S$GLB,, | Performed by: PSYCHIATRY & NEUROLOGY

## 2019-05-29 PROCEDURE — 3077F PR MOST RECENT SYSTOLIC BLOOD PRESSURE >= 140 MM HG: ICD-10-PCS | Mod: CPTII,S$GLB,, | Performed by: PSYCHIATRY & NEUROLOGY

## 2019-05-29 RX ORDER — LOSARTAN POTASSIUM 100 MG/1
TABLET ORAL
Refills: 0 | COMMUNITY
Start: 2019-03-28

## 2019-05-29 RX ORDER — AMLODIPINE BESYLATE 5 MG/1
TABLET ORAL
Refills: 0 | COMMUNITY
Start: 2019-04-11

## 2019-05-29 NOTE — LETTER
May 29, 2019                   [facility] - Neurology  [address]  [facility] [address]  Phone: [phone]  Fax: [phone]   Patient: Cynthia Peck   MR Number: [id number]   YOB: 1970   Date of Visit: 5/29/2019       To [facility] it may concern    I see Mrs. Peck for a speech disorder (speech spasms). She takes Keppra for this as is doing well. She has at times intermittent low soft voice.  She may return to work however at times her low voice impacts ability to answer the phone.    Sincerely,            Kayy Siu MD, MS  [facility]  Department of Neurology  Movement Disorders

## 2019-05-29 NOTE — PROGRESS NOTES
"MOVEMENT DISORDERS CLINIC Followup  PCP/Referring Provider: Kayy Siu MD  [address] [place] [address]  Date of Service: 5/29/2019    Chief Complaint: Dysphonia and diaphragmatic spasm    Interval Hx  Since last visit, renal US normal after MRI spine found a renal lesion    Doubled keppra to 1000mg PO BID has near-ceased spasmin g with an occasional typical "heh-heh"  She feels sleepy during the AM at times after 1000mg keppra    Brain MRI, Cspine and Tspine NEG for lesions after reviewing them with radiology    No major weight loss noted  No weakness, trouble walking, slurred speech    "PriorHPI: Cynthia Peck is a R HANDED 49 y.o. female with a medical issues significant for DM, HTN, HL, who presents with dysphonia and diaphragmatic contractions. She had a typical URI November 2018, and then afterwards noticed near constant diaphragmatic contractions pushing the air out of her lungs and at the same time throat spasming which results in her uttering "clucking sounds." Disease is not progressing. She is unclear if it occurs during sleep. Spasming can quit for 2 days and then come back to near-constant. No triggers that she can think of. Talking does not make it worse. 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 When she tries to speak, words are interrupted by spasming and chucking. Voice is breathy and high-pitched.     No curling to fingers, toes, or spasming in neck muscles. No trouble walking or stumbing.   She was admitted to the hospital Nov 30th.    Tried baclofen "3 times a day" - which helped but due to sleepiness, stopped  ETOH does not seem to help    Thinks she had a brain MRI while admitted  No major weightloss noted  Daughter had CP, dystonic features in hands (she is with mom today) - wide based spastic gait- trouble walking as a child, and this "got better" - has a  shunt"    Review of Systems:   Review of Systems   Constitutional: Negative for fever. "   HENT: Negative for congestion.    Eyes: Negative for double vision.   Respiratory: Negative for cough and shortness of breath.    Cardiovascular: Negative for chest pain and leg swelling.   Gastrointestinal: Negative for nausea.   Genitourinary: Negative for dysuria.   Musculoskeletal: Negative for falls.   Skin: Negative for rash.   Neurological: Positive for speech change. Negative for tremors and headaches.   Psychiatric/Behavioral: Negative for depression.       Neuroleptic exposure:  None    Current Medications:  Outpatient Encounter Medications as of 5/29/2019   Medication Sig Dispense Refill    glipiZIDE (GLUCOTROL) 10 MG TR24 Take 10 mg by mouth 2 (two) times daily. Take 1 pill in am, 1/2 at night      levETIRAcetam (KEPPRA) 1000 MG tablet 1000mg AM, 1500mg PM 90 tablet 11    losartan (COZAAR) 50 MG tablet Take 50 mg by mouth once daily.       metFORMIN (GLUCOPHAGE-XR) 500 MG 24 hr tablet Take 1,000 mg by mouth 2 (two) times daily with meals.       pravastatin (PRAVACHOL) 80 MG tablet Take 80 mg by mouth every evening.       semaglutide (OZEMPIC) 0.25 mg or 0.5 mg(2 mg/1.5 mL) PnIj Inject 0.25 mg into the skin every 7 days.      amLODIPine (NORVASC) 5 MG tablet   0    losartan (COZAAR) 100 MG tablet   0     No facility-administered encounter medications on file as of 5/29/2019.        Past Medical History:  HTN, HL, Diabetes    Past Surgical History:  None    Current Living Situation: home with daughter    Social:  Social History     Socioeconomic History    Marital status: Single     Spouse name: Not on file    Number of children: Not on file    Years of education: Not on file    Highest education level: Not on file   Occupational History    Not on file   Social Needs    Financial resource strain: Not on file    Food insecurity:     Worry: Not on file     Inability: Not on file    Transportation needs:     Medical: Not on file     Non-medical: Not on file   Tobacco Use    Smoking status:  "Never Smoker    Smokeless tobacco: Never Used   Substance and Sexual Activity    Alcohol use: Not Currently    Drug use: Never    Sexual activity: Not on file   Lifestyle    Physical activity:     Days per week: Not on file     Minutes per session: Not on file    Stress: Not on file   Relationships    Social connections:     Talks on phone: Not on file     Gets together: Not on file     Attends Yarsanism service: Not on file     Active member of club or organization: Not on file     Attends meetings of clubs or organizations: Not on file     Relationship status: Not on file   Other Topics Concern    Not on file   Social History Narrative    Not on file       Family History:  As above    PHYSICAL:  BP (!) 147/94   Pulse 80   Ht 4' 11" (1.499 m)   Wt 58.1 kg (128 lb)   BMI 25.85 kg/m²     General Medical Examination:  General: Good hygiene, appropriate appearance.  HEENT: Normocephalic, atraumatic.   Neck: Supple.   Chest: Unlabored breathing.   CV: Symmetric pulses.   Ext: No clubbing, cyanosis, or edema.     Mental Status:  Mood/Affect: Appropriate/congruent.  Level of consciousness: Awake, alert.  Orientation: Oriented to person, place, time and situation.  Language: No Dysarthria    Cranial nerves:  I: Not tested  II: PERRL, VFF to counting  III, IV, VI: EOMI with conjugate gaze and no nystagmus on end gaze  V: Facial sensation intact and symmetric over the bilateral V1-V3  VII: Facial muscle activation intact and symmetric over the bilateral upper and lower face  VIII: Hearing intact in the b/l ears and symmetrical to finger rub  IX, X, XII: TUP midline - no atrophy or fasiculations  X: SCMs and shoulder shrug full strength b/l and symmetric  Nohypomimia    Motor:   Strength Intact throughout upper and lower extremities, 5/5.  Fasciculations/Atrophy: None    No tremors or dystonic posturing in hands  No stimulus-induced myoclonus    No diaphragmatic dpasming  No chuckling sounds   No breathiness to " speech    DTRs:  ? Biceps Triceps Brachioradialis Knee Ankle   Left 2+ 2+ 2+ 2+ 2+   Right 2+ 2+ 2+ 2+ 2+   -Plantar reflex: down    ? Finger taps Finger flicks ADELITA Heel taps   Left 0 0 0 0   Right 0 0 0 0   Neck tone: 0  ? Arm Leg   Left 0 0   Right 0 0     Sensation:   -Light touch: Intact and symmetric in the bilateral upper and lower extremities.  -Temp: Intact and symmetric in the bilateral upper and lower extremities.  -Vibration: Intact and symmetric in the bilateral upper and lower extremities.    Coordination:   -Finger to nose: nl  -Heel to shin: nl    Gait:  -Arises from chair withnl use of hands.  -Casual gait is: nl based  -Stride length: nl  -Arm Swing: nl  -Turning: nl  -Heel walking: nl  -Toe walking: nl  -Tandem gait: nl    Laboratory Data:  NA    Imaging:  NA    Assessment//Plan: Cynthia Peck is a R HANDED 48 y.o. female with a medical issues significant for DM, HTN, HL, who presents with dysphonia and diaphragmatic contractions.  Problem List Items Addressed This Visit        Pulmonary    Diaphragmatic disorder - Primary    Current Assessment & Plan     Sudden onset vocal and diaphragmatic spasming, post-viral suggests post-encephalitic myoclonus. Her brain, cspine and tspine was negative for obvious lesions responsible. DDx at this point includes post-viral central myoclonus vs peripheral nerve myoclonus. No weight loss to suggest paraneoplastic dz. Panel neg. Literature suggests keppra as first line. Suggested she try keppra 500mg QAM and 1500 at night due to report of daytime sleepiness.         Relevant Orders    Ambulatory consult to Speech Therapy       Renal/    Renal cyst    Current Assessment & Plan     Renal US shows uncomplicated cyst               Follow-up: 3 month    Kayy Siu MD, MS [facility]  Department of Neurology  Movement Disorders